# Patient Record
Sex: FEMALE | URBAN - NONMETROPOLITAN AREA
[De-identification: names, ages, dates, MRNs, and addresses within clinical notes are randomized per-mention and may not be internally consistent; named-entity substitution may affect disease eponyms.]

---

## 2021-03-17 ENCOUNTER — OFFICE VISIT (OUTPATIENT)
Dept: URBAN - NONMETROPOLITAN AREA CLINIC 9 | Facility: CLINIC | Age: 56
End: 2021-03-17

## 2021-08-28 ENCOUNTER — TELEPHONE ENCOUNTER (OUTPATIENT)
Dept: URBAN - METROPOLITAN AREA CLINIC 13 | Facility: CLINIC | Age: 56
End: 2021-08-28

## 2021-08-29 ENCOUNTER — TELEPHONE ENCOUNTER (OUTPATIENT)
Dept: URBAN - METROPOLITAN AREA CLINIC 13 | Facility: CLINIC | Age: 56
End: 2021-08-29

## 2022-09-02 ENCOUNTER — OFFICE VISIT (OUTPATIENT)
Dept: URBAN - METROPOLITAN AREA CLINIC 46 | Facility: CLINIC | Age: 57
End: 2022-09-02
Payer: COMMERCIAL

## 2022-09-02 DIAGNOSIS — R13.13 PHARYNGEAL DYSPHAGIA: ICD-10-CM

## 2022-09-02 DIAGNOSIS — K21.9 GASTROESOPHAGEAL REFLUX DISEASE WITHOUT ESOPHAGITIS: ICD-10-CM

## 2022-09-02 DIAGNOSIS — R10.33 PERIUMBILICAL PAIN: ICD-10-CM

## 2022-09-02 DIAGNOSIS — Z86.010 PERSONAL HISTORY OF COLONIC POLYPS: ICD-10-CM

## 2022-09-02 PROBLEM — 443503005: Status: ACTIVE | Noted: 2022-09-02

## 2022-09-02 PROCEDURE — 99204 OFFICE O/P NEW MOD 45 MIN: CPT | Performed by: NURSE PRACTITIONER

## 2022-09-02 RX ORDER — OMEPRAZOLE 40 MG/1
CAPSULE, DELAYED RELEASE ORAL
Qty: 90 CAPSULE | Status: ACTIVE | COMMUNITY

## 2022-09-02 RX ORDER — TRAMADOL HYDROCHLORIDE 50 MG/1
TABLET ORAL
Qty: 120 TABLET | Status: ACTIVE | COMMUNITY

## 2022-09-02 RX ORDER — DULOXETINE 30 MG/1
CAPSULE, DELAYED RELEASE ORAL
Qty: 30 CAPSULE | Status: ACTIVE | COMMUNITY

## 2022-09-02 RX ORDER — CLOPIDOGREL BISULFATE 75 MG/1
TABLET, FILM COATED ORAL
Qty: 90 TABLET | Status: ACTIVE | COMMUNITY

## 2022-09-02 RX ORDER — GABAPENTIN 300 MG/1
CAPSULE ORAL
Qty: 270 CAPSULE | Status: ACTIVE | COMMUNITY

## 2022-09-02 RX ORDER — OLANZAPINE 15 MG/1
TABLET, FILM COATED ORAL
Qty: 90 TABLET | Status: ACTIVE | COMMUNITY

## 2022-09-02 RX ORDER — ATORVASTATIN CALCIUM 40 MG/1
TABLET, FILM COATED ORAL
Qty: 90 TABLET | Status: ACTIVE | COMMUNITY

## 2022-09-02 RX ORDER — BENZTROPINE 1 MG/1
TABLET ORAL
Qty: 180 TABLET | Status: ACTIVE | COMMUNITY

## 2022-09-02 RX ORDER — PREGABALIN 150 MG/1
CAPSULE ORAL
Qty: 90 CAPSULE | Status: ACTIVE | COMMUNITY

## 2022-09-02 RX ORDER — DICYCLOMINE HYDROCHLORIDE 10 MG/1
1 CAPSULE CAPSULE ORAL
Qty: 90 | Refills: 1 | OUTPATIENT

## 2022-09-02 RX ORDER — BUSPIRONE HYDROCHLORIDE 5 MG/1
TABLET ORAL
Qty: 180 TABLET | Status: ACTIVE | COMMUNITY

## 2022-09-02 RX ORDER — ZOLPIDEM TARTRATE 5 MG/1
TABLET, COATED ORAL
Qty: 30 TABLET | Status: ACTIVE | COMMUNITY

## 2022-09-02 RX ORDER — FLUOXETINE 20 MG/1
CAPSULE ORAL
Qty: 90 CAPSULE | Status: ACTIVE | COMMUNITY

## 2022-09-02 RX ORDER — INSULIN GLARGINE 100 [IU]/ML
INJECTION, SOLUTION SUBCUTANEOUS
Qty: 45 MILLILITER | Status: ACTIVE | COMMUNITY

## 2022-09-02 RX ORDER — CLONAZEPAM 1 MG/1
TABLET ORAL
Qty: 60 TABLET | Status: ACTIVE | COMMUNITY

## 2022-09-02 NOTE — HPI-TODAY'S VISIT:
56 year old female presents via telehealth for evaluation of abdominal pain. The patient has been having 6 months of mid abdominal pain that feels like a cramp. She has not noted any triggers. An ultrasound was ordered per PCP but we do not have the report. The patient says she was told she has spleen enlargement and fatty liver along with liver enlargement. CT in 2/2021 showed liver enlargement.  Bowel movements are daily of formed stool. GERD is well controlled with Omeptrazole 40mg daily. Last EGD/colon  was in 2017 in [place] (we do not have report) and she states she had polyps removed. The patient has a history of dysphagia and had dilatation with last EGD. Currently she has mild dysphagia to some solids. She has a history of CVA and has been on Plavix for approximately the last 5 years.
Jeremy

## 2022-09-22 ENCOUNTER — TELEPHONE ENCOUNTER (OUTPATIENT)
Dept: URBAN - METROPOLITAN AREA CLINIC 46 | Facility: CLINIC | Age: 57
End: 2022-09-22

## 2022-09-22 ENCOUNTER — LAB OUTSIDE AN ENCOUNTER (OUTPATIENT)
Dept: URBAN - METROPOLITAN AREA CLINIC 46 | Facility: CLINIC | Age: 57
End: 2022-09-22

## 2022-09-22 PROBLEM — 428283002: Status: ACTIVE | Noted: 2022-09-02

## 2022-10-11 ENCOUNTER — CLAIMS CREATED FROM THE CLAIM WINDOW (OUTPATIENT)
Dept: URBAN - METROPOLITAN AREA CLINIC 48 | Facility: CLINIC | Age: 57
End: 2022-10-11
Payer: COMMERCIAL

## 2022-10-11 ENCOUNTER — DASHBOARD ENCOUNTERS (OUTPATIENT)
Age: 57
End: 2022-10-11

## 2022-10-11 ENCOUNTER — OFFICE VISIT (OUTPATIENT)
Dept: URBAN - METROPOLITAN AREA CLINIC 48 | Facility: CLINIC | Age: 57
End: 2022-10-11

## 2022-10-11 ENCOUNTER — CLAIMS CREATED FROM THE CLAIM WINDOW (OUTPATIENT)
Dept: URBAN - METROPOLITAN AREA CLINIC 48 | Facility: CLINIC | Age: 57
End: 2022-10-11

## 2022-10-11 VITALS
BODY MASS INDEX: 37.17 KG/M2 | WEIGHT: 202 LBS | HEIGHT: 62 IN | SYSTOLIC BLOOD PRESSURE: 157 MMHG | DIASTOLIC BLOOD PRESSURE: 82 MMHG

## 2022-10-11 DIAGNOSIS — R13.13 PHARYNGEAL DYSPHAGIA: ICD-10-CM

## 2022-10-11 DIAGNOSIS — R10.33 PERIUMBILICAL PAIN: ICD-10-CM

## 2022-10-11 DIAGNOSIS — R11.0 NAUSEA: ICD-10-CM

## 2022-10-11 DIAGNOSIS — R63.4 UNINTENTIONAL WEIGHT LOSS: ICD-10-CM

## 2022-10-11 DIAGNOSIS — K21.9 GASTROESOPHAGEAL REFLUX DISEASE WITHOUT ESOPHAGITIS: ICD-10-CM

## 2022-10-11 DIAGNOSIS — R10.13 EPIGASTRIC PAIN: ICD-10-CM

## 2022-10-11 PROBLEM — 448765001: Status: ACTIVE | Noted: 2022-10-11

## 2022-10-11 PROBLEM — 266435005: Status: ACTIVE | Noted: 2022-09-02

## 2022-10-11 PROBLEM — 422587007: Status: ACTIVE | Noted: 2022-10-11

## 2022-10-11 PROBLEM — 79922009: Status: ACTIVE | Noted: 2022-10-11

## 2022-10-11 PROCEDURE — 99213 OFFICE O/P EST LOW 20 MIN: CPT | Performed by: INTERNAL MEDICINE

## 2022-10-11 RX ORDER — INSULIN GLARGINE 100 [IU]/ML
AS DIRECTED INJECTION, SOLUTION SUBCUTANEOUS ONCE A DAY
Status: ACTIVE | COMMUNITY

## 2022-10-11 RX ORDER — BUSPIRONE HYDROCHLORIDE 5 MG/1
1 TABLET TABLET ORAL TWICE A DAY
Qty: 180 TABLET | Status: ACTIVE | COMMUNITY

## 2022-10-11 RX ORDER — GABAPENTIN 300 MG/1
1 CAPSULE CAPSULE ORAL THREE TIMES A DAY
Qty: 270 CAPSULE | Status: ACTIVE | COMMUNITY

## 2022-10-11 RX ORDER — PREGABALIN 150 MG/1
1 CAPSULE CAPSULE ORAL ONCE A DAY
Qty: 30 CAPSULE | Status: ACTIVE | COMMUNITY

## 2022-10-11 RX ORDER — BENZTROPINE 1 MG/1
1 TABLET TABLET ORAL TWICE A DAY
Qty: 180 TABLET | Status: ACTIVE | COMMUNITY

## 2022-10-11 RX ORDER — DICYCLOMINE HYDROCHLORIDE 10 MG/1
1 CAPSULE CAPSULE ORAL
Qty: 90 | Refills: 1
End: 2022-12-10

## 2022-10-11 RX ORDER — OMEPRAZOLE 40 MG/1
1 CAPSULE 30 MINUTES BEFORE MORNING MEAL CAPSULE, DELAYED RELEASE ORAL ONCE A DAY
Status: ACTIVE | COMMUNITY

## 2022-10-11 RX ORDER — ZOLPIDEM TARTRATE 5 MG/1
1 TABLET AT BEDTIME AS NEEDED TABLET, COATED ORAL ONCE A DAY
Qty: 30 TABLET | Status: ACTIVE | COMMUNITY

## 2022-10-11 RX ORDER — DICYCLOMINE HYDROCHLORIDE 10 MG/1
1 CAPSULE CAPSULE ORAL
Qty: 90 | Refills: 1 | Status: ACTIVE | COMMUNITY

## 2022-10-11 RX ORDER — ONDANSETRON 4 MG/1
1 TABLET ON THE TONGUE AND ALLOW TO DISSOLVE TABLET, ORALLY DISINTEGRATING ORAL
Qty: 90 | Refills: 1 | OUTPATIENT

## 2022-10-11 RX ORDER — SUCRALFATE 1 G/1
1 TABLET ON AN EMPTY STOMACH TABLET ORAL THREE TIMES A DAY
Qty: 42 TABLET | Refills: 0 | OUTPATIENT

## 2022-10-11 RX ORDER — FLUOXETINE 20 MG/1
1 CAPSULE CAPSULE ORAL ONCE A DAY
Qty: 90 CAPSULE | Status: ACTIVE | COMMUNITY

## 2022-10-11 RX ORDER — OLANZAPINE 15 MG/1
1 TABLET TABLET, FILM COATED ORAL ONCE A DAY
Qty: 90 TABLET | Status: ACTIVE | COMMUNITY

## 2022-10-11 RX ORDER — ATORVASTATIN CALCIUM 40 MG/1
1 TABLET TABLET, FILM COATED ORAL ONCE A DAY
Qty: 90 TABLET | Status: ACTIVE | COMMUNITY

## 2022-10-11 RX ORDER — DULOXETINE 30 MG/1
1 CAPSULE CAPSULE, DELAYED RELEASE ORAL TWICE A DAY
Qty: 60 CAPSULE | Status: ACTIVE | COMMUNITY

## 2022-10-11 RX ORDER — CLONAZEPAM 1 MG/1
1 TABLET TABLET ORAL ONCE A DAY
Qty: 30 TABLET | Status: ACTIVE | COMMUNITY

## 2022-10-11 RX ORDER — CLOPIDOGREL BISULFATE 75 MG/1
1 TABLET TABLET, FILM COATED ORAL ONCE A DAY
Qty: 90 TABLET | Status: ACTIVE | COMMUNITY

## 2022-10-11 NOTE — HPI-TODAY'S VISIT:
56 year old female presents via telehealth for evaluation of nausea. The patient has post prandial nausea which causes her decreased appetite. She has los t 42 lb in one month. Currently she is on Omeprazole 40 mg daily. Abdominal pain is periumbilical and is at random. Dicyclomine 10mg TID prn helps. About 5 years ago, EGD by an outside provider and she does not know results but states she had esophageal dilatation. Last colonoscopy was about 6 years ago and she was told to come back in 5 years.

## 2022-12-09 ENCOUNTER — TELEPHONE ENCOUNTER (OUTPATIENT)
Dept: URBAN - METROPOLITAN AREA CLINIC 48 | Facility: CLINIC | Age: 57
End: 2022-12-09

## 2023-01-20 ENCOUNTER — OFFICE VISIT (OUTPATIENT)
Dept: URBAN - NONMETROPOLITAN AREA MEDICAL CENTER 6 | Facility: MEDICAL CENTER | Age: 58
End: 2023-01-20

## 2023-02-01 ENCOUNTER — TELEPHONE ENCOUNTER (OUTPATIENT)
Dept: URBAN - METROPOLITAN AREA CLINIC 48 | Facility: CLINIC | Age: 58
End: 2023-02-01

## 2023-02-06 ENCOUNTER — TELEPHONE ENCOUNTER (OUTPATIENT)
Dept: URBAN - METROPOLITAN AREA CLINIC 48 | Facility: CLINIC | Age: 58
End: 2023-02-06

## 2023-02-22 PROBLEM — 21101000119105: Status: ACTIVE | Noted: 2022-09-02

## 2023-03-16 ENCOUNTER — OFFICE VISIT (OUTPATIENT)
Dept: URBAN - NONMETROPOLITAN AREA MEDICAL CENTER 6 | Facility: MEDICAL CENTER | Age: 58
End: 2023-03-16
Payer: COMMERCIAL

## 2023-03-16 DIAGNOSIS — R11.0 AM NAUSEA: ICD-10-CM

## 2023-03-16 DIAGNOSIS — Z86.010 ADENOMAS PERSONAL HISTORY OF COLONIC POLYPS: ICD-10-CM

## 2023-03-16 DIAGNOSIS — D12.5 ADENOMA OF SIGMOID COLON: ICD-10-CM

## 2023-03-16 DIAGNOSIS — K29.60 ADENOPAPILLOMATOSIS GASTRICA: ICD-10-CM

## 2023-03-16 DIAGNOSIS — K63.89 APPENDICITIS EPIPLOICA: ICD-10-CM

## 2023-03-16 PROCEDURE — 45380 COLONOSCOPY AND BIOPSY: CPT | Performed by: INTERNAL MEDICINE

## 2023-03-16 PROCEDURE — 43239 EGD BIOPSY SINGLE/MULTIPLE: CPT | Performed by: INTERNAL MEDICINE
